# Patient Record
(demographics unavailable — no encounter records)

---

## 2025-03-05 NOTE — PHYSICAL EXAM
[No Acute Distress] : no acute distress [Normal Sclera/Conjunctiva] : normal sclera/conjunctiva [Normal Outer Ear/Nose] : the outer ears and nose were normal in appearance [No Respiratory Distress] : no respiratory distress  [No Accessory Muscle Use] : no accessory muscle use [No Edema] : there was no peripheral edema [Normal] : affect was normal and insight and judgment were intact

## 2025-03-06 NOTE — HISTORY OF PRESENT ILLNESS
[Family Member] : family member [Home] : at home, [unfilled] , at the time of the visit. [Other Location: e.g. Home (Enter Location, City,State)___] : at [unfilled] [Telehealth (audio & video)] : This visit was provided via telehealth using real-time 2-way audio visual technology. [Verbal consent obtained from patient] : the patient, [unfilled] [FreeTextEntry1] : F/u post hospitalization at [facility] from 2/20 -28 for FTT [de-identified] :  Brief Hospital Course copied: 92 y.o. M w/ a hx of CKD3, COPD on 2-3L, DM2, HTN, HLD who presents with shortness of breath and leg swelling. Patient was living independently in [place] and noted over the last six months that he was becoming progressively weaker and slower. Patient's daughter decided to drive the patient from [place] to [place] to re-locate him closer to her and brought the patient straight to the hospital. Patient was noted to have LE edema, patient underwent TTE which showed severe pulmonary hypertension and RV dysfunction. He was diuresis to euvolemia. Home oxygen was set up. Of note, patient was found to have pulmonary nodules, will need to follow up with outpatient pulmonary.  Televisit with pt and his dgtr,. pt is alert and oriented x4 able to answer all questions. He states he is feeling much better, edema and sob has resolved. Still taking using O2 24/7 @2-3l/m. Pt is ambulatory with a walker. Reports his appetite is improving, discussed low salt, low fat, low cholesterol, diabetic diet, likes Glucerna. Informed him and dgtr fried chicken can be made at home instead of from fast food locations. Pt reports he has all his medications and is compliant with them. His dgtr is scheduling his medical appts. Reminded them of lung nodules and need for PULM f/u to determine next steps. They agreed.

## 2025-03-06 NOTE — REVIEW OF SYSTEMS
[Shortness Of Breath] : shortness of breath [Muscle Weakness] : muscle weakness [Memory Loss] : memory loss [Negative] : Heme/Lymph [Fever] : no fever [Chills] : no chills [Fatigue] : no fatigue [Hearing Loss] : no hearing loss [Chest Pain] : no chest pain [Palpitations] : no palpitations [Lower Ext Edema] : no lower extremity edema [Cough] : no cough [Abdominal Pain] : no abdominal pain [Nausea] : no nausea [Constipation] : no constipation [Vomiting] : no vomiting [Dysuria] : no dysuria [Incontinence] : no incontinence [Joint Pain] : no joint pain [Back Pain] : no back pain [Itching] : no itching [Skin Rash] : no skin rash [Headache] : no headache [Dizziness] : no dizziness [Unsteady Walk] : no ataxia [Suicidal] : not suicidal [Insomnia] : no insomnia [Anxiety] : no anxiety [Depression] : no depression [FreeTextEntry9] : uses walker

## 2025-03-06 NOTE — PLAN
[FreeTextEntry1] : 1. cont all medications as prescribed 2. maintain low fat, low cholesterol, low salt diabetic diet 3. keep all f/u medical appts 4. maintain home safety - use walker when ambulating,  -cont O2 2-3 l/m 24/7 as prescribed

## 2025-03-06 NOTE — ASSESSMENT
[FreeTextEntry1] : 92 y.o. M w/ a hx of CKD3, COPD on 2-3L, DM2, HTN, HLD who presents with shortness of breath and leg swelling. Patient was living independently in [place] and noted over the last six months that he was becoming progressively weaker and slower. Patient's daughter decided to drive the patient from [place] to [place] to re-locate him closer to her and brought the patient straight to the hospital. Patient was noted to have LE edema, patient underwent TTE which showed severe pulmonary hypertension and RV dysfunction. He was diuresis to euvolemia. Home oxygen was set up. Of note, patient was found to have pulmonary nodules, will need to follow up with outpatient pulmonary.